# Patient Record
Sex: FEMALE | Race: WHITE | NOT HISPANIC OR LATINO
[De-identification: names, ages, dates, MRNs, and addresses within clinical notes are randomized per-mention and may not be internally consistent; named-entity substitution may affect disease eponyms.]

---

## 2021-01-12 PROBLEM — Z00.00 ENCOUNTER FOR PREVENTIVE HEALTH EXAMINATION: Status: ACTIVE | Noted: 2021-01-12

## 2022-02-14 ENCOUNTER — NON-APPOINTMENT (OUTPATIENT)
Age: 82
End: 2022-02-14

## 2022-02-14 ENCOUNTER — APPOINTMENT (OUTPATIENT)
Dept: COLORECTAL SURGERY | Facility: CLINIC | Age: 82
End: 2022-02-14
Payer: MEDICARE

## 2022-02-14 VITALS
TEMPERATURE: 97.7 F | BODY MASS INDEX: 30.94 KG/M2 | OXYGEN SATURATION: 98 % | DIASTOLIC BLOOD PRESSURE: 66 MMHG | HEIGHT: 62 IN | SYSTOLIC BLOOD PRESSURE: 135 MMHG | HEART RATE: 70 BPM | WEIGHT: 168.13 LBS

## 2022-02-14 DIAGNOSIS — D68.8 OTHER SPECIFIED COAGULATION DEFECTS: ICD-10-CM

## 2022-02-14 DIAGNOSIS — Z87.19 PERSONAL HISTORY OF OTHER DISEASES OF THE DIGESTIVE SYSTEM: ICD-10-CM

## 2022-02-14 DIAGNOSIS — Z86.79 PERSONAL HISTORY OF OTHER DISEASES OF THE CIRCULATORY SYSTEM: ICD-10-CM

## 2022-02-14 DIAGNOSIS — K62.5 HEMORRHAGE OF ANUS AND RECTUM: ICD-10-CM

## 2022-02-14 PROCEDURE — 46600 DIAGNOSTIC ANOSCOPY SPX: CPT

## 2022-02-14 PROCEDURE — 99203 OFFICE O/P NEW LOW 30 MIN: CPT

## 2022-02-14 RX ORDER — GABAPENTIN 300 MG/1
300 CAPSULE ORAL
Refills: 0 | Status: ACTIVE | COMMUNITY

## 2022-02-14 RX ORDER — GABAPENTIN 100 MG/1
100 CAPSULE ORAL
Refills: 0 | Status: ACTIVE | COMMUNITY

## 2022-02-14 RX ORDER — ATORVASTATIN CALCIUM 40 MG/1
40 TABLET, FILM COATED ORAL
Refills: 0 | Status: ACTIVE | COMMUNITY

## 2022-02-14 RX ORDER — AMLODIPINE BESYLATE 5 MG/1
5 TABLET ORAL
Refills: 0 | Status: ACTIVE | COMMUNITY

## 2022-02-14 RX ORDER — TORSEMIDE 10 MG/1
10 TABLET ORAL
Refills: 0 | Status: ACTIVE | COMMUNITY

## 2022-02-14 RX ORDER — ASPIRIN/ACETAMINOPHEN/CAFFEINE 500-325-65
POWDER IN PACKET (EA) ORAL
Refills: 0 | Status: ACTIVE | COMMUNITY

## 2022-02-14 RX ORDER — UBIDECARENONE/VIT E ACET 100MG-5
CAPSULE ORAL
Refills: 0 | Status: ACTIVE | COMMUNITY

## 2022-02-14 RX ORDER — FAMOTIDINE 20 MG/1
20 TABLET, FILM COATED ORAL
Refills: 0 | Status: ACTIVE | COMMUNITY

## 2022-02-14 RX ORDER — MULTIVIT-MIN/FA/LYCOPEN/LUTEIN .4-300-25
TABLET ORAL
Refills: 0 | Status: ACTIVE | COMMUNITY

## 2022-02-14 NOTE — PHYSICAL EXAM
[Abdomen Masses] : No abdominal masses [Abdomen Tenderness] : ~T No ~M abdominal tenderness [None] : no anal fissures seen [Excoriation] : no perianal excoriation [Fistula] : no fistulas [Wart] : no warts [Ulcer ___ cm] : no ulcers [Nonprolapsing] : a nonprolapsing (grade I) [Tender, Swollen] : nontender, non-swollen [Thrombosed] : that was not thrombosed [Skin Tags] : residual hemorrhoidal skin tags were noted [Normal] : was normal [Stool Sample Taken] : no stool obtained on rectal exam [Gross Blood] : no gross blood [JVD] : no jugular venous distention  [Thyroid] : the thyroid was abnormal [Carotid Bruits] : no carotid bruits [Normal Heart Sounds] : normal heart sounds [Normal Breath Sounds] : Normal breath sounds [2+] : left 2+ [No Rash or Lesion] : No rash or lesion [Alert] : alert [Calm] : calm [de-identified] : scars well healed.   [de-identified] : moderated sized external skin tags.  NO fistula or fissure.  Digital: normal tone and lumen, no masses.  Anoscopy: grade 0-1 internal hemorrhoids, no acute inflammation or bleeding noted.  no proctitis [de-identified] : NAD

## 2022-02-14 NOTE — HISTORY OF PRESENT ILLNESS
[FreeTextEntry1] : 81 year old female post resection with ileostomy and then closure in 2007.  Recently developed rectal bleeding and was in local ED 2/5/22.   She started bleeding about  2/2/22 which progressively became worse,  Also had some vomiting.  Went to the ED on 2/5/22, was transfused  one unit. Scope found large diverticulosis, no active bleed.  Was discharged, on 2/6/22.   Had a little bleeding after that. \par \par She saw the cardiologist in the meant time and was allowed to stop ASA for  5 days. Since she stopped it she has not had any bleeding.  Must restart the ASA tomorrow. \par \par Bowel movements. are daily.  Before the  bleeding she  had IBS type symptoms.  Stools were frequent,  loose, generally eats a lot of fruits and vegetables.  Since Friday has been on a low residue diet.

## 2022-02-14 NOTE — ASSESSMENT
[FreeTextEntry1] : S/P segmental left colectomy in 2007.  Has had periodic colonoscopy since.  Last one was 2018. \par On ASA for coronary stent.  Was on ASA at time of bleed below.\par \par Lower GI bleed with impressive  BRBPR that led to ER visit to [facility].  Patient got 1 unit PRBC's.\par Also got colonoscopy there the next morning.  Blood found to the transverse colon level.  No bleeding site found.  Diverticulosis noted.  NO neoplasms seen.  Given FeSO4 pills.\par \par Been off the ASA x last 5 days or so.  To restart soon.\par \par On exam, no findings.  No BRBPR.  \par \par Plan will be for patient to take Fe pills, restart ASA (as per cardiology), and observe.  If rebleeds at a slow rate will call and come to [facility] ER.  If rapid bleeding occurs again will go to local ER and request either CTA or tagged RBC scan and might get another C-scope as well.  \par \par Dietary wise: advised low residue diet (since she noted bloating and has some cramps with high residue diet with raw fruit and vegetables).  \par Increase water intake.  \par Colace advised.  \par \par